# Patient Record
(demographics unavailable — no encounter records)

---

## 2025-07-10 NOTE — ASSESSMENT
[FreeTextEntry1] : He does not have significant bothersome voiding symptoms.  Nocturia is minimal.  He empties bladder well.  PSA level was normal previously and another PSA was ordered for him.  Tadalafil was refilled.  He can follow-up in 1 year.  Reza Rivera MD, FACS [facility]  of Urology [address] [place] [address], [address] [place] [address] Tel: [phone] Fax: [phone]

## 2025-07-10 NOTE — LETTER BODY
[Dear  ___] : Dear  [unfilled], [Consult Letter:] : I had the pleasure of evaluating your patient, [unfilled]. [Consult Closing:] : Thank you very much for allowing me to participate in the care of this patient.  If you have any questions, please do not hesitate to contact me. [FreeTextEntry1] : Address: [address] [place] [address] [place] [address]\par  \par  Phone: [phone]

## 2025-07-10 NOTE — PHYSICAL EXAM
[Urethral Meatus] : meatus normal [Penis Abnormality] : normal circumcised penis [Urinary Bladder Findings] : the bladder was normal on palpation [Scrotum] : the scrotum was normal [Epididymis] : the epididymides were normal [Testes Tenderness] : no tenderness of the testes [Testes Mass (___cm)] : there were no testicular masses [FreeTextEntry1] : Small testes, no epididymal cyst palpated.  Exam was done in the past [General Appearance - Well Developed] : well developed [General Appearance - Well Nourished] : well nourished [Normal Appearance] : normal appearance [Well Groomed] : well groomed [] : no respiratory distress [Exaggerated Use Of Accessory Muscles For Inspiration] : no accessory muscle use [Abdomen Soft] : soft [Normal Station and Gait] : the gait and station were normal for the patient's age [Oriented To Time, Place, And Person] : oriented to person, place, and time [Affect] : the affect was normal [Mood] : the mood was normal [Not Anxious] : not anxious

## 2025-07-10 NOTE — HISTORY OF PRESENT ILLNESS
[FreeTextEntry1] : He is a 74 year-old man who is seen today for erectile dysfunction.  He usually has no nocturia.  Sometimes nocturia is once.  Residual urine today was minimal.  He is using tadalafil 15 mg as needed for erectile dysfunction.  In March 2025 creatinine was 1.2.  Urinalysis was normal. In May 2024 A1c was 5.9, testosterone 316, urinalysis and urine culture were normal and PSA was 0.38.   He has history of elevated prolactin level and epididymal cyst.  He follows with endocrinology.  He has undergone work-up previously for microscopic hematuria.    Previous notes: In April 2019, urinalysis was normal, prolactin level 13.4 and PSA 0.14. Testosterone level previously was 247.  He was initially seen for microscopic hematuria. US showed small bilateral epididymal cysts and normal kidneys. Prostate was small and post void residual was minimal.   Testosterone level was 309, urinalysis negative and urine cultures showed GBS in September 2016. He has undergone cystoscopy and renal imaging in the past that showed a 1 cm right renal cyst. He has family history of prostate cancer.